# Patient Record
Sex: FEMALE | Race: WHITE | NOT HISPANIC OR LATINO | Employment: UNEMPLOYED | URBAN - METROPOLITAN AREA
[De-identification: names, ages, dates, MRNs, and addresses within clinical notes are randomized per-mention and may not be internally consistent; named-entity substitution may affect disease eponyms.]

---

## 2021-10-28 ENCOUNTER — OFFICE VISIT (OUTPATIENT)
Dept: URGENT CARE | Facility: URGENT CARE | Age: 26
End: 2021-10-28
Payer: COMMERCIAL

## 2021-10-28 ENCOUNTER — ANCILLARY PROCEDURE (OUTPATIENT)
Dept: GENERAL RADIOLOGY | Facility: CLINIC | Age: 26
End: 2021-10-28
Attending: INTERNAL MEDICINE
Payer: COMMERCIAL

## 2021-10-28 VITALS
SYSTOLIC BLOOD PRESSURE: 137 MMHG | HEART RATE: 102 BPM | DIASTOLIC BLOOD PRESSURE: 84 MMHG | WEIGHT: 130 LBS | OXYGEN SATURATION: 99 % | TEMPERATURE: 99.2 F | RESPIRATION RATE: 18 BRPM

## 2021-10-28 DIAGNOSIS — Z87.81 HISTORY OF RIB FRACTURE: ICD-10-CM

## 2021-10-28 DIAGNOSIS — Z20.822 SUSPECTED COVID-19 VIRUS INFECTION: ICD-10-CM

## 2021-10-28 DIAGNOSIS — S29.9XXA TRAUMATIC INJURY OF RIB: ICD-10-CM

## 2021-10-28 DIAGNOSIS — R07.89 RIGHT-SIDED CHEST WALL PAIN: ICD-10-CM

## 2021-10-28 DIAGNOSIS — R07.1 PAINFUL BREATHING: ICD-10-CM

## 2021-10-28 DIAGNOSIS — S22.31XA CLOSED FRACTURE OF ONE RIB OF RIGHT SIDE, INITIAL ENCOUNTER: Primary | ICD-10-CM

## 2021-10-28 LAB — SARS-COV-2 RNA RESP QL NAA+PROBE: NEGATIVE

## 2021-10-28 PROCEDURE — U0003 INFECTIOUS AGENT DETECTION BY NUCLEIC ACID (DNA OR RNA); SEVERE ACUTE RESPIRATORY SYNDROME CORONAVIRUS 2 (SARS-COV-2) (CORONAVIRUS DISEASE [COVID-19]), AMPLIFIED PROBE TECHNIQUE, MAKING USE OF HIGH THROUGHPUT TECHNOLOGIES AS DESCRIBED BY CMS-2020-01-R: HCPCS | Performed by: INTERNAL MEDICINE

## 2021-10-28 PROCEDURE — 71101 X-RAY EXAM UNILAT RIBS/CHEST: CPT | Mod: RT | Performed by: RADIOLOGY

## 2021-10-28 PROCEDURE — 99204 OFFICE O/P NEW MOD 45 MIN: CPT | Performed by: INTERNAL MEDICINE

## 2021-10-28 PROCEDURE — U0005 INFEC AGEN DETEC AMPLI PROBE: HCPCS | Performed by: INTERNAL MEDICINE

## 2021-10-28 RX ORDER — IBUPROFEN 600 MG/1
TABLET, FILM COATED ORAL
COMMUNITY
Start: 2021-09-28

## 2021-10-28 RX ORDER — GABAPENTIN 600 MG/1
600 TABLET ORAL
COMMUNITY
Start: 2020-01-13

## 2021-10-28 RX ORDER — HYDROCODONE BITARTRATE AND ACETAMINOPHEN 5; 325 MG/1; MG/1
1 TABLET ORAL EVERY 6 HOURS PRN
Qty: 18 TABLET | Refills: 0 | Status: SHIPPED | OUTPATIENT
Start: 2021-10-28 | End: 2021-10-31

## 2021-10-28 RX ORDER — ETONOGESTREL AND ETHINYL ESTRADIOL VAGINAL RING .015; .12 MG/D; MG/D
1 RING VAGINAL
COMMUNITY
Start: 2020-11-11 | End: 2021-11-11

## 2021-10-28 RX ORDER — HYDROXYZINE HYDROCHLORIDE 25 MG/1
25 TABLET, FILM COATED ORAL
COMMUNITY
Start: 2020-07-09

## 2021-10-28 ASSESSMENT — ENCOUNTER SYMPTOMS
RHINORRHEA: 0
SORE THROAT: 0
FEVER: 0
COUGH: 0
DIAPHORESIS: 0
SHORTNESS OF BREATH: 1
CHILLS: 0
FATIGUE: 1

## 2021-10-28 NOTE — PATIENT INSTRUCTIONS
X-ray confirms rib fracture.  Ongoing activity with gymnastics most likely has need it difficult to heal.  No heavy activities over the next 6 weeks.    Ice to area  You may wear a rib belt but make sure you take deep breaths to avoid catching pneumonia.  You may also just wear this during sleep.  Vicodin 1 tablet up to 3 times a day as needed.  Continue ibuprofen 400 mg 3 times a day    Follow-up with your primary provider for ongoing concerns    You have a slight fever.  I did not notice pneumonia on your x-ray.  Covid test pending  Please do not smoke while you have this rib injury and fevers.    Recheck 1 week      Patient Education     Rib Contusion or Minor Fracture    A rib contusion is a bruise to one or more rib bones. It may cause pain, tenderness, swelling, and a purplish color to the skin. There may be a sharp pain with each breath. A rib contusion takes anywhere from a few days to a few weeks to heal. A minor rib fracture or break may cause the same symptoms as a rib contusion. The small crack may not be seen on a regular chest X-ray. Treatment for both problems is basically the same.  Home care    You may use over-the-counter pain medicine to control pain, unless another pain medicine was prescribed. If you have chronic liver or kidney disease or ever had a stomach ulcer or GI (gastrointestinal) bleeding, talk with your healthcare provider before using these medicines.    Rest. Don't lift anything heavy or do any activity that causes pain.    Apply an ice pack over the injured area for 15 to 20 minutes every 1 to 2 hours. You should do this for the first 24 to 48 hours. To make an ice pack, put ice cubes in a plastic bag that seals at the top. Wrap the bag in a clean, thin towel or cloth. Never put ice or an ice pack directly on the skin. Continue with ice packs as needed for the relief of pain and swelling.    The first 3 to 4 weeks of healing will be the most painful. If your pain is not under  control with the treatment given, call your healthcare provider. Sometimes a stronger pain medicine may be needed. A nerve block can be done in case of severe pain. It will numb the nerve between the ribs.  Follow-up care  Follow up with your healthcare provider, or as advised.  If X-rays were taken, you will be told of any new findings that may affect your care.  Call 911  Call 911 if you have:    Dizziness, weakness or fainting    Shortness of breath with or without chest discomfort    New or worsening pain  When to seek medical advice  Call your healthcare provider right away if any of these occur:    Fever of 100.4 F (38 C) or higher, or as directed by your healthcare provider    Chills    Stomach pain, vomiting  [employer] last reviewed this educational content on 6/1/2018 2000-2021 [employer]. All rights reserved. This information is not intended as a substitute for professional medical care. Always follow your healthcare professional's instructions.           Patient Education     Rib Belt  A rib belt is an elastic strap that may help reduce pain from chest muscle strain or rib fracture. The belt limits motion in the chest and can help relieve pain from the chest wall and ribs. However, if worn too long, the rib belt can sometimes cause pneumonia or other problems. This is because it reduces the air flow into your lungs.  You can help avoid this problem by opening the belt and taking several very deep breaths once an hour while you're awake. As you breathe out, purse your lips as if you if you were blowing up a balloon. If possible, actually blow up a balloon or a rubber glove. This exercise builds up pressure inside the lung and prevents collapse of the small air sacs of the lung. This exercise may cause some pain at the site of injury, which is normal. Sometimes a special device called an incentive spirometer may be given for this purpose.  Call 911  Call 911 if you have:    Shortness of  breath    Increasing chest pain with breathing    Dizziness, weakness, or fainting    Development or worsening of abdominal pain    When to seek medical advice  Call your healthcare provider right away if any of these occur:    Pain over injured area increases    Fever of 100.4 F (38 C) or higher, or as directed by your healthcare provider    Adiel Pinzon last reviewed this educational content on 5/1/2018 2000-2020 [employer]. [address] [place] [address]. All rights reserved. This information is not intended as a substitute for professional medical care. Always follow your healthcare professional's instructions.

## 2021-10-28 NOTE — PROGRESS NOTES
ASSESSMENT AND PLAN:      ICD-10-CM    1. Closed fracture of one rib of right side, initial encounter  S22.31XA HYDROcodone-acetaminophen (NORCO) 5-325 MG tablet     Miscellaneous Order for DME - ONLY FOR DME   2. Suspected COVID-19 virus infection  Z20.822 Symptomatic COVID-19 Virus (Coronavirus) by PCR Nose     XR Ribs & Chest Right G/E 3 Views   3. Traumatic injury of rib  S29.9XXA XR Ribs & Chest Right G/E 3 Views   4. Right-sided chest wall pain  R07.89 XR Ribs & Chest Right G/E 3 Views   5. Painful breathing  R07.1 XR Ribs & Chest Right G/E 3 Views   6. History of rib fracture  Z87.81 XR Ribs & Chest Right G/E 3 Views    right 6th rib 2020       Differential Diagnosis:  Chest x-ray obtained with rib details to look for potential fracture that was not picked up with first chest x-ray examination.  Also look for potential pneumonia as she does have a slight fever.  Patient potentially could have been splinting/favoring right sided chest wall with not as deep breaths.  And developed pneumonia related to this  Patient also smokes e-cigarettes and not vaccinated against Covid.  Covid test performed  Chest x-ray also screens for Covid pneumonia    Serious Comorbid Conditions: E-cigarette usage      PLAN:      Patient Instructions     X-ray confirms rib fracture.  Ongoing activity with gymnastics most likely has need it difficult to heal.  No heavy activities over the next 6 weeks.    Ice to area  You may wear a rib belt but make sure you take deep breaths to avoid catching pneumonia.  You may also just wear this during sleep.  Vicodin 1 tablet up to 3 times a day as needed.  Continue ibuprofen 400 mg 3 times a day    Follow-up with your primary provider for ongoing concerns    You have a slight fever.  I did not notice pneumonia on your x-ray.  Covid test pending  Please do not smoke while you have this rib injury and fevers.    Recheck 1 week      Patient Education     Rib Contusion or Minor Fracture    A rib  contusion is a bruise to one or more rib bones. It may cause pain, tenderness, swelling, and a purplish color to the skin. There may be a sharp pain with each breath. A rib contusion takes anywhere from a few days to a few weeks to heal. A minor rib fracture or break may cause the same symptoms as a rib contusion. The small crack may not be seen on a regular chest X-ray. Treatment for both problems is basically the same.  Home care    You may use over-the-counter pain medicine to control pain, unless another pain medicine was prescribed. If you have chronic liver or kidney disease or ever had a stomach ulcer or GI (gastrointestinal) bleeding, talk with your healthcare provider before using these medicines.    Rest. Don't lift anything heavy or do any activity that causes pain.    Apply an ice pack over the injured area for 15 to 20 minutes every 1 to 2 hours. You should do this for the first 24 to 48 hours. To make an ice pack, put ice cubes in a plastic bag that seals at the top. Wrap the bag in a clean, thin towel or cloth. Never put ice or an ice pack directly on the skin. Continue with ice packs as needed for the relief of pain and swelling.    The first 3 to 4 weeks of healing will be the most painful. If your pain is not under control with the treatment given, call your healthcare provider. Sometimes a stronger pain medicine may be needed. A nerve block can be done in case of severe pain. It will numb the nerve between the ribs.  Follow-up care  Follow up with your healthcare provider, or as advised.  If X-rays were taken, you will be told of any new findings that may affect your care.  Call 911  Call 911 if you have:    Dizziness, weakness or fainting    Shortness of breath with or without chest discomfort    New or worsening pain  When to seek medical advice  Call your healthcare provider right away if any of these occur:    Fever of 100.4 F (38 C) or higher, or as directed by your healthcare  provider    Adiel    Stomach pain, vomiting  [employer] last reviewed this educational content on 6/1/2018 2000-2021 [employer]. All rights reserved. This information is not intended as a substitute for professional medical care. Always follow your healthcare professional's instructions.           Patient Education     Rib Belt  A rib belt is an elastic strap that may help reduce pain from chest muscle strain or rib fracture. The belt limits motion in the chest and can help relieve pain from the chest wall and ribs. However, if worn too long, the rib belt can sometimes cause pneumonia or other problems. This is because it reduces the air flow into your lungs.  You can help avoid this problem by opening the belt and taking several very deep breaths once an hour while you're awake. As you breathe out, purse your lips as if you if you were blowing up a balloon. If possible, actually blow up a balloon or a rubber glove. This exercise builds up pressure inside the lung and prevents collapse of the small air sacs of the lung. This exercise may cause some pain at the site of injury, which is normal. Sometimes a special device called an incentive spirometer may be given for this purpose.  Call 911  Call 911 if you have:    Shortness of breath    Increasing chest pain with breathing    Dizziness, weakness, or fainting    Development or worsening of abdominal pain    When to seek medical advice  Call your healthcare provider right away if any of these occur:    Pain over injured area increases    Fever of 100.4 F (38 C) or higher, or as directed by your healthcare provider    Adiel Pinzon last reviewed this educational content on 5/1/2018 2000-2020 [employer]. [address] [facility], [place] [address]. All rights reserved. This information is not intended as a substitute for professional medical care. Always follow your healthcare professional's instructions.             Return in about  1 week (around 11/4/2021).        Ronna Sandoval MD  [facility]    Subjective     Tara Delaney is a 26 year old who presents for Patient presents with:  Urgent Care: right rib injury play football a month ago     a new patient of [facility].    MS Injury/Pain    Onset of symptoms was 1 month(s) ago.  Location: right rib cage  Context:       The injury happened while playing FB 9/28  She was evaluated for this injury.  Chest x-ray was negative for fracture.  She may have aggravated her injury over the past month with participating in gymnastic play with her stepdaughter.  She does have a history of rib fracture from 2020 from slip on ice    Over the past month the pain has been worsening  Sharp rib pain right anterior chest area  Denies symptoms of illness  Aggravating Factors: movement and breathing  Therapies to improve symptoms include: ibuprofen  This is not the first time this type of problem has occurred for this patient.       Not  Vaccinated against Covid  Occasional e-cigs    Hx 6 fracture rib 2020 - slipped on ice/ tore shoulder    Review of Systems   Constitutional: Positive for fatigue. Negative for chills, diaphoresis and fever.   HENT: Negative for rhinorrhea and sore throat.    Respiratory: Positive for shortness of breath (worse since injury). Negative for cough.         Sharp pain.r side           Objective    /84   Pulse 102   Temp 99.2  F (37.3  C) (Oral)   Resp 18   Wt 59 kg (130 lb)   SpO2 99%   Physical Exam  Vitals reviewed.   Constitutional:       General: She is not in acute distress.     Appearance: Normal appearance. She is not ill-appearing.   HENT:      Mouth/Throat:      Mouth: Mucous membranes are moist.      Pharynx: Oropharynx is clear.   Eyes:      Conjunctiva/sclera: Conjunctivae normal.   Cardiovascular:      Rate and Rhythm: Normal rate and regular rhythm.      Pulses: Normal pulses.      Heart sounds: Normal heart sounds.   Pulmonary:       Effort: Pulmonary effort is normal.      Breath sounds: Normal breath sounds.   Chest:      Chest wall: Tenderness (Right anterior chest wall pain) present.   Neurological:      Mental Status: She is alert.            Xray - Reviewed and interpreted by me.  Rib fracture noted

## 2021-11-03 ENCOUNTER — HOSPITAL ENCOUNTER (EMERGENCY)
Facility: CLINIC | Age: 26
Discharge: HOME | End: 2021-11-03
Attending: EMERGENCY MEDICINE | Admitting: EMERGENCY MEDICINE
Payer: COMMERCIAL

## 2021-11-03 VITALS
SYSTOLIC BLOOD PRESSURE: 133 MMHG | HEART RATE: 99 BPM | DIASTOLIC BLOOD PRESSURE: 83 MMHG | OXYGEN SATURATION: 100 % | RESPIRATION RATE: 18 BRPM

## 2021-11-03 DIAGNOSIS — F10.920 ALCOHOLIC INTOXICATION WITHOUT COMPLICATION (H): ICD-10-CM

## 2021-11-03 PROCEDURE — 99283 EMERGENCY DEPT VISIT LOW MDM: CPT

## 2021-11-03 NOTE — ED PROVIDER NOTES
History   Chief Complaint:  Alcohol Intoxication       The history is provided by the EMS personnel and the patient.      Tara Delaney is a 26 year old female with history of depression and anxiety who presents with alcohol intoxication. Patient had a scheduled flight to [place] this afternoon and while at the airport, had multiple cocktails. She had a verbal argument with her traveling partner. EMS was called and determined that she was too intoxicated to travel, so she was brought into the ED. She states that she wishes to go home and has called her significant other to arrange a ride. She denies any trauma or pain. She denies taking Vicodin today and states that her last dose was yesterday for her rib pain. Her injury is not bothering her today.       Review of Systems   Psychiatric/Behavioral: Positive for behavioral problems.   All other systems reviewed and are negative.    Allergies:  Penicillins    Medications:  Neurontin   Atarax   Zoloft   Wellbutrin   Inderal    Past Medical History:     Depression   Anxiety   Hyperthyroidism      Social History:  Patient presents to the ED via EMS.  Presents with her significant other.   Positive for alcohol use.     Physical Exam     Patient Vitals for the past 24 hrs:   BP Pulse Resp SpO2   11/03/21 1532 133/83 99 18 100 %       Physical Exam  Eye:  Pupils are equal, round, and reactive.  Extraocular movements intact.    ENT:  No rhinorrhea.  Moist mucus membranes.  Normal tongue and tonsil.    Cardiac:  Regular rate and rhythm.  No murmurs, gallops, or rubs.    Pulmonary:  Clear to auscultation bilaterally.  No wheezes, rales, or rhonchi.    Abdomen:  Positive bowel sounds.  Abdomen is soft and non-distended, without focal tenderness.    Musculoskeletal:  Normal movement of all extremities without evidence for deficit.    Skin:  Warm and dry without rashes.    Neurologic:  Non-focal exam without asymmetric weakness or numbness.     Psychiatric:  Patient is  intoxicated but conversant and ambulatory without assistance.    Boyfriend at bedside, comfortable to take her home.       Emergency Department Course     Emergency Department Course:  Reviewed:  I reviewed nursing notes, vitals, past medical history and Care Everywhere    Assessments:    1625 I obtained history and examined the patient as noted above.     1645 I rechecked the patient and explained findings.     Disposition:  The patient was discharged to home.     Impression & Plan     Medical Decision Making:  This unfortunate 26-year-old presents to us from the airport in an intoxicated state.  She admits that she had several cocktails while waiting for her flight to [place], and got involved in some type of an argument with the person that she was fine with.  EMS found that she was too intoxicated to care for self and therefore brought her to our emergency department.    The patient has no complaints at the time of my assessment.  She has sobered some and is now ambulatory without assistance.  Her boyfriend is at the bedside and is comfortable taking her home.  Head to toe exam shows no evidence of trauma.  Her vital signs are reassuring.  With this, I feel she is safe for discharge home with acute alcohol intoxication.  She will otherwise return to us for any worsening of condition or other emergent concerns.    Diagnosis:    ICD-10-CM    1. Alcoholic intoxication without complication (H)  F10.920      Scribe Disclosure:  I, John Gil, am serving as a scribe at 4:27 PM on 11/3/2021 to document services personally performed by Trierweiler, Chad A, MD based on my observations and the provider's statements to me.            Trierweiler, Chad A, MD  11/04/21 8434

## 2021-11-03 NOTE — DISCHARGE INSTRUCTIONS

## 2025-04-23 ENCOUNTER — OFFICE VISIT (OUTPATIENT)
Dept: URGENT CARE | Facility: URGENT CARE | Age: 30
End: 2025-04-23
Payer: COMMERCIAL

## 2025-04-23 VITALS
HEART RATE: 89 BPM | OXYGEN SATURATION: 96 % | DIASTOLIC BLOOD PRESSURE: 69 MMHG | SYSTOLIC BLOOD PRESSURE: 110 MMHG | RESPIRATION RATE: 19 BRPM | WEIGHT: 171 LBS | TEMPERATURE: 98.8 F

## 2025-04-23 DIAGNOSIS — B96.89 BV (BACTERIAL VAGINOSIS): Primary | ICD-10-CM

## 2025-04-23 DIAGNOSIS — N76.0 BV (BACTERIAL VAGINOSIS): Primary | ICD-10-CM

## 2025-04-23 LAB
ALBUMIN UR-MCNC: NEGATIVE MG/DL
APPEARANCE UR: CLEAR
BILIRUB UR QL STRIP: NEGATIVE
CLUE CELLS: PRESENT
COLOR UR AUTO: YELLOW
GLUCOSE UR STRIP-MCNC: NEGATIVE MG/DL
HGB UR QL STRIP: NEGATIVE
KETONES UR STRIP-MCNC: NEGATIVE MG/DL
LEUKOCYTE ESTERASE UR QL STRIP: NEGATIVE
NITRATE UR QL: NEGATIVE
PH UR STRIP: 6.5 [PH] (ref 5–7)
SP GR UR STRIP: 1.01 (ref 1–1.03)
TRICHOMONAS, WET PREP: ABNORMAL
UROBILINOGEN UR STRIP-ACNC: 0.2 E.U./DL
WBC'S/HIGH POWER FIELD, WET PREP: ABNORMAL
YEAST, WET PREP: ABNORMAL

## 2025-04-23 PROCEDURE — 3078F DIAST BP <80 MM HG: CPT | Performed by: PREVENTIVE MEDICINE

## 2025-04-23 PROCEDURE — 81003 URINALYSIS AUTO W/O SCOPE: CPT | Performed by: PREVENTIVE MEDICINE

## 2025-04-23 PROCEDURE — 3074F SYST BP LT 130 MM HG: CPT | Performed by: PREVENTIVE MEDICINE

## 2025-04-23 PROCEDURE — 87210 SMEAR WET MOUNT SALINE/INK: CPT | Performed by: PREVENTIVE MEDICINE

## 2025-04-23 PROCEDURE — 99203 OFFICE O/P NEW LOW 30 MIN: CPT | Performed by: PREVENTIVE MEDICINE

## 2025-04-23 RX ORDER — METRONIDAZOLE 500 MG/1
500 TABLET ORAL 2 TIMES DAILY
Qty: 14 TABLET | Refills: 0 | Status: SHIPPED | OUTPATIENT
Start: 2025-04-23 | End: 2025-04-30

## 2025-04-23 NOTE — PROGRESS NOTES
Urgent Care Clinic Visit    Chief Complaint   Patient presents with    Urgent Care    Urinary Problem     Pt presents burning urination, vaginal odor and itchiness, onset 6 days.   More medications to be reconciled.                4/23/2025     1:56 PM   Additional Questions   Roomed by Jana Prater   Accompanied by none     Pre-Provider Visit Orders- Urinalysis UA/UC  Patient reports the following symptoms:  discomfort, pain or burning with urination   Does the patient report any of the following symptoms: vaginal discharge, vaginal itching, possible yeast infection, has a urinary catheter in place, or unable to void in a specimen cup?  Patient complains of vaginal itching   Pre-Provider Visit Orders - Vaginal Infection  Reason for visit:  Possible yeast infection

## 2025-04-23 NOTE — PROGRESS NOTES
Assessment & Plan     (N76.0,  B96.89) BV (bacterial vaginosis)  (primary encounter diagnosis)  - Wet prep - Clinic Collect  - UA Macroscopic with reflex to Microscopic and Culture - Clinic Collect  Flagyl for 7 days    Follow up in 10-14 days if not improving or sooner as needed          No follow-ups on file.    Damien Alvarez MD  [facility]    Subjective     Tara Delaney is a 30 year old year old female who presents to clinic today for the following health issues:    Patient presents with:  Urgent Care  Urinary Problem: Pt presents burning urination, vaginal odor and itchiness, onset 6 days.   More medications to be reconciled.     This is a 29 yo female who presents with increased vaginal discharge and odor and urinary frequency over the past week.  No f/c/n/v/abdominal pain/back pain.    There is no problem list on file for this patient.      Current Outpatient Medications   Medication Sig Dispense Refill    hydrOXYzine (ATARAX) 25 MG tablet Take 25 mg by mouth      ibuprofen (ADVIL/MOTRIN) 600 MG tablet       metroNIDAZOLE (FLAGYL) 500 MG tablet Take 1 tablet (500 mg) by mouth 2 times daily for 7 days. 14 tablet 0    etonogestrel-ethinyl estradiol (NUVARING) 0.12-0.015 MG/24HR vaginal ring Place 1 each vaginally every 28 days      gabapentin (NEURONTIN) 600 MG tablet Take 600 mg by mouth (Patient not taking: Reported on 4/23/2025)       No current facility-administered medications for this visit.       No past medical history on file.    Social History   reports that she has never smoked. She has never used smokeless tobacco.    No family history on file.    Review of Systems  Constitutional, HEENT, cardiovascular, pulmonary, GI, , musculoskeletal, neuro, skin, endocrine and psych systems are negative, except as otherwise noted.      Objective    /69   Pulse 89   Temp 98.8  F (37.1  C) (Tympanic)   Resp 19   Wt 77.6 kg (171 lb)   SpO2 96%   Physical Exam    GENERAL: alert and no distress  EYES: Eyes grossly normal to inspection, PERRL and conjunctivae and sclerae normal  HENT: ear canals and TM's normal, nose and mouth without ulcers or lesions  NECK: no adenopathy, no asymmetry, masses, or scars  RESP: lungs clear to auscultation - no rales, rhonchi or wheezes  CV: regular rate and rhythm, normal S1 S2, no S3 or S4, no murmur, click or rub, no peripheral edema  ABDOMEN: soft, nontender, no hepatosplenomegaly, no masses and bowel sounds normal  MS: no gross musculoskeletal defects noted, no edema  SKIN: no suspicious lesions or rashes  NEURO: Normal strength and tone, mentation intact and speech normal  PSYCH: mentation appears normal, affect normal/bright    Results for orders placed or performed in visit on 04/23/25   UA Macroscopic with reflex to Microscopic and Culture - Clinic Collect     Status: Normal    Specimen: Urine, Clean Catch   Result Value Ref Range    Color Urine Yellow Colorless, Straw, Light Yellow, Yellow    Appearance Urine Clear Clear    Glucose Urine Negative Negative mg/dL    Bilirubin Urine Negative Negative    Ketones Urine Negative Negative mg/dL    Specific Gravity Urine 1.010 1.003 - 1.035    Blood Urine Negative Negative    pH Urine 6.5 5.0 - 7.0    Protein Albumin Urine Negative Negative mg/dL    Urobilinogen Urine 0.2 0.2, 1.0 E.U./dL    Nitrite Urine Negative Negative    Leukocyte Esterase Urine Negative Negative    Narrative    Microscopic not indicated   Wet prep - Clinic Collect     Status: Abnormal    Specimen: Vagina; Swab   Result Value Ref Range    Trichomonas Absent Absent    Yeast Absent Absent    Clue Cells Present (A) Absent    WBCs/high power field 2+ (A) None